# Patient Record
Sex: FEMALE | Race: WHITE
[De-identification: names, ages, dates, MRNs, and addresses within clinical notes are randomized per-mention and may not be internally consistent; named-entity substitution may affect disease eponyms.]

---

## 2020-08-30 ENCOUNTER — HOSPITAL ENCOUNTER (EMERGENCY)
Dept: HOSPITAL 62 - ER | Age: 35
Discharge: HOME | End: 2020-08-30
Payer: SELF-PAY

## 2020-08-30 VITALS — SYSTOLIC BLOOD PRESSURE: 121 MMHG | DIASTOLIC BLOOD PRESSURE: 73 MMHG
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
## 2020-08-30 DIAGNOSIS — L03.012: ICD-10-CM

## 2020-08-30 DIAGNOSIS — F17.210: ICD-10-CM

## 2020-08-30 DIAGNOSIS — S60.351A: Primary | ICD-10-CM

## 2020-08-30 DIAGNOSIS — Z88.0: ICD-10-CM
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
## 2020-08-30 PROCEDURE — 99283 EMERGENCY DEPT VISIT LOW MDM: CPT

## 2020-08-30 NOTE — ER DOCUMENT REPORT
ED Hand/Wrist Injury





- General


Chief Complaint: Hand Pain


Stated Complaint: FINGER PAIN


Time Seen by Provider: 20 14:59


Primary Care Provider: 


[facility] [Provider Group] - Follow up as needed


[facility] [Provider Group] - Follow up as needed


Mode of Arrival: Ambulatory


Information source: Patient


Notes: 





35-year-old female presented to ED for the complaint of a splinter in the right 

thumb.  She states it has been there for about 6 days and now it has an 

infection and swelling and very painful throbbing.  The area was cleaned well 

with Betadine the splint was removed, thumb was cleaned well with soap and water

bacitracin and Band-Aid applied and patient will be sent home with prescription 

for Keflex.











REVIEW OF SYSTEMS:


CONSTITUTIONAL :  Denies fever,  chills, or sweats.  Denies recent illness.


SKIN:   Foreign body with infection to the end of the right thumb


.


ALL OTHER SYSTEMS REVIEWED AND NEGATIVE.








VITAL SIGNS: Within normal limits.


GENERAL:  No acute distress, non-toxic appearance.  


HEAD:  Normal with no signs of head trauma.


EYES:  PERRLA, EOMI, conjunctiva normal, no discharge.  


EARS:  Hearing grossly intact.


NOSE: Normal.


THROAT:  Oropharynx is normal. 


NECK:  Normal range of motion, no tenderness, supple, no lymphadenopathy, No 

adenopathy, no JVD.   


CHEST:  Clear breath sounds bilaterally.  No wheezes, rales, or rhonchi.  


CARDIAC:  Regular rate and rhythm.  S1 and S2, without murmurs, gallops, or 

rubs.


VASCULAR:  No Edema.  Peripheral pulses normal and equal in all extremities.


ABDOMEN: Normal and soft with no tenderness, no masses or pulsatile masses.


GASTROINTESTINAL: Bowel sounds normal


GENITOURINARY: Normal, No tenderness


LYMPATHTIC:  No lymphadenopathy noted.


MUSCULOSKELETAL:  Good range of motion of all major joints. Extremities without 

clubbing, cyanosis or edema.  


NEUROLOGICAL:  Alert and oriented x 3.  No focal sensory or strength deficits.  

Speech normal.  Follows commands appropriately.


PSYCHIATRIC:  Normal Affect, judgement and mood.


SKIN: Foreign body in the right distal thumb with abscess.  Patient states had 

complete relief after splinter was removed an abscess drained.





- HPI


Injury to: Thumb


Onset: Other - 6 days ago


Where: Home


Timing: Still present


Quality of pain: Sharp, Throbbing


Severity: Moderate


Pain Level: 3


Context: Other - Foreign body in right thumb with abscess





- Related Data


Allergies/Adverse Reactions: 


                                        





amoxicillin Allergy (Verified 20 14:52)


   


Penicillins Allergy (Verified 20 14:52)


   











Past Medical History





- General


Information source: Patient





- Social History


Smoking Status: Current Every Day Smoker


Cigarette use (# per day): Yes


Smoking Education Provided: Yes


Frequency of alcohol use: None


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


Family History: Reviewed & Not Pertinent


Patient has suicidal ideation: No


Patient has homicidal ideation: No





- Past Medical History


Cardiac Medical History: Reports: None


Pulmonary Medical History: Reports: None


EENT Medical History: Reports: None


Neurological Medical History: Reports: None


Endocrine Medical History: Reports: None


Renal/ Medical History: Reports: None


Malignancy Medical History: Reports: None


GI Medical History: Reports: None


Musculoskeletal Medical History: Reports None


Skin Medical History: Reports None


Psychiatric Medical History: Reports: None


Traumatic Medical History: Reports: None


Infectious Medical History: Reports: None


Past Surgical History: Reports: Hx  Section, Hx Tonsillectomy





Physical Exam





- Vital signs


Vitals: 


                                        











Temp Pulse Resp BP Pulse Ox


 


 98.2 F   89   18   121/73   100 


 


 20 14:25  20 14:25  20 14:25  20 14:25  20 14:25














Course





- Vital Signs


Vital signs: 


                                        











Temp Pulse Resp BP Pulse Ox


 


 98.2 F   89   18   121/73   100 


 


 20 14:25  20 14:25  20 14:25  20 14:25  20 14:25














Procedures





- Incision and Drainage


  ** Right Thumb


Time completed: 15:10


Type: Simple


Anesthetic type: Other - 0


mL's of anesthetic: 0


Blade size: Other - 18 ga


I&D procedure: Betadine prep applied


Incision Method: Incision made by scalpel


Amount/type of drainage: Moderate amount of purulent drainage with a splinter





Discharge





- Discharge


Clinical Impression: 


 Foreign body of right thumb with infection





Condition: Stable


Disposition: HOME, SELF-CARE


Additional Instructions: 


ABSCESS:





     You have an abscess (boil).  This a pus-forming infection, usually due to 

staph.  Some boils may be left to drain on their own, but most require lancing.


     From the time the tender lump first appears, it may be three or four days 

before the abscess is ready to julian.  Local heat and rest help at this stage of

treatment.  An antibiotic may prevent spread of the infection.


     Once the abscess is opened, packing may be placed into it.  This is done so

pus is not sealed inside by premature closure of the cavity. The packing will be

removed at your follow-up visit or you may be advised to remove it yourself at 

home.  Sometimes this packing must be replaced a few times during healing.


     The wound will heal with surprisingly little scar.  Depending on the size 

and location of an abscess, healing can take one to four weeks.


     You may shower and wash the area around the incision site two or three 

times a day.


     Antibiotics may be prescribed, but are usually not necessary after an 

abscess has been drained.


     If you develop fever, chills, worsening pain, or increasing swelling in the

area, call the doctor or return immediately.








POST INCISION AND DRAINAGE:


     You have had an incision made to allow drainage of an abscess. The incision

must remain open so that pus and debris can drain from the wound.


     If the abscess cavity is large, packing is placed.  This keeps the tissues 

from collapsing and trapping pus inside, while the body shrinks the cavity.  The

packing may need to be replaced every day or two.  The physician will instruct 

you on the packing.


     Keep a bulky dressing over the area.  Replace it if it becomes saturated 

with blood or pus.  Do not disturb the packing (if present).


     You may shower and cleanse the area with gentle soap and warm water two or 

three times a day.  Local warmth may be soothing, and may promote faster 

healing.


     Return if you develop high fever or chills, or if you note spreading 

redness, increasing swelling, or increasing tenderness.





CEPHALEXIN:


     The antibiotic you've been prescribed is a member of the cephalosporin 

class.  This type of antibiotic covers a wide variety of infections, including 

those of the skin, lungs, and urinary tract. It's useful for staph infections.


     This antibiotic is slightly similar to the penicillin family. In rare 

cases, a person who is allergic to penicillin will also be allergic to this 

medication.  If you have had a severe allergic reaction to penicillin, and have 

not taken this antibiotic since that time, notify your doctor.


     Antibiotics which cover many germs ("broad spectrum" antibiotics) are more 

likely to cause diarrhea or "yeast" infections.  Women prone to vaginal yeast 

problems may suffer an attack after taking this antibiotic.  In infants, oral 

thrush (white spots "stuck" on the cheek) or yeast diaper rash may result.  See 

your doctor if these problems occur.  Call at once if you develop itching, 

hives, shortness of breath, or lightheadedness.





Epsom Salt Soaks





     Soak the wound area in a container of warm epsom salt water.  If you can't 

get the wound area into a bucket or pan, use a folded towel soaked in the epsom 

salt solution and apply to the area.


     Use clean hot tap water (about the temperature of a very warm bath), mixing

in about one (1) teaspoon for every pint of water.


           Two gallon --> 16 teaspoons Epsom Salts


           One gallon -->  8 teaspoons Epsom Salts


           Two quarts -->  4 teaspoons Epsom Salts


           One quart  -->  2 teaspoons Epsom Salts


     Soak the wound for about 20 minutes while gently moving it around in the 

water.  Repeat this four (4) times a day.





Antibiotic Ointment Protection





     Your wounds are such that dressing them is not practical or optional.  

After cleansing, you should apply a thin coating of antibiotic ointment 

(Bacitracin, not Neosporin) to the wounds at least three times daily.  This 

lessens infection risk, and may decrease the amount of scarring.  Use a q-tip or

dull butter knife, not your finger, to apply this ointment.


     Any debris or ooze which builds up in the ointment should be gently rubbed 

off with a sterile gauze pad.  Harder crusting may need to be gently scrubbed 

off with a clean wash cloth with soap and warm water, perhaps applying a warm, 

wet wash cloth to the wound for ten minutes first.


     Development of redness, severe itching, or blistering may mean allergy to 

the ointment.  See the doctor.








Ibuprofen





     Ibuprofen is an excellent, safe drug for pain control.  In addition, it has

potent antiinflammatory effects which are beneficial, especially in the 

treatment of injuries, arthritis, or tendonitis. It's best to take ibuprofen 

with food.  Persons with ulcer disease or allergy to aspirin should notify their

physician of this before taking ibuprofen.


     Take the medication exactly as prescribed.  Don't take additional doses 

unless instructed to do so by your doctor.  If you develop wheezing, shortness 

of breath, hives, faintness, stomach pain, vomiting, or dark black stools, 

return for re-evaluation at once.





FOLLOW-UP CARE:


Most simple abscesses will not require a follow up visit.   If you had packing 

placed in the abscess, remove it as instructed by the physician.  If you have be

en referred to a physician for follow-up care, call the physicians office for 

an appointment as you were instructed or within the next two days.  If you 

experience worsening or a significant change in your symptoms, return to the 

Emergency Department at any time for re-evaluation.





Prescriptions: 


Cephalexin Monohydrate [Keflex 500 mg Capsule] 500 mg PO Q6H 5 Days #20 capsule


Referrals: 


[facility] [Provider Group] - Follow up as needed


[facility] [Provider Group] - Follow up as needed

## 2020-08-31 ENCOUNTER — HOSPITAL ENCOUNTER (EMERGENCY)
Dept: HOSPITAL 62 - ER | Age: 35
LOS: 1 days | Discharge: HOME | End: 2020-09-01
Payer: SELF-PAY

## 2020-08-31 DIAGNOSIS — L02.511: Primary | ICD-10-CM

## 2020-08-31 DIAGNOSIS — L03.011: ICD-10-CM

## 2020-08-31 DIAGNOSIS — Z98.890: ICD-10-CM

## 2020-08-31 DIAGNOSIS — F17.210: ICD-10-CM

## 2020-08-31 DIAGNOSIS — Z88.0: ICD-10-CM

## 2020-08-31 PROCEDURE — 80048 BASIC METABOLIC PNL TOTAL CA: CPT

## 2020-08-31 PROCEDURE — 26010 DRAINAGE OF FINGER ABSCESS: CPT

## 2020-08-31 PROCEDURE — 87077 CULTURE AEROBIC IDENTIFY: CPT

## 2020-08-31 PROCEDURE — 96375 TX/PRO/DX INJ NEW DRUG ADDON: CPT

## 2020-08-31 PROCEDURE — 87040 BLOOD CULTURE FOR BACTERIA: CPT

## 2020-08-31 PROCEDURE — 73140 X-RAY EXAM OF FINGER(S): CPT

## 2020-08-31 PROCEDURE — 87070 CULTURE OTHR SPECIMN AEROBIC: CPT

## 2020-08-31 PROCEDURE — 85025 COMPLETE CBC W/AUTO DIFF WBC: CPT

## 2020-08-31 PROCEDURE — 96365 THER/PROPH/DIAG IV INF INIT: CPT

## 2020-08-31 PROCEDURE — 87205 SMEAR GRAM STAIN: CPT

## 2020-08-31 PROCEDURE — 99284 EMERGENCY DEPT VISIT MOD MDM: CPT

## 2020-08-31 PROCEDURE — 87186 SC STD MICRODIL/AGAR DIL: CPT

## 2020-08-31 PROCEDURE — 36415 COLL VENOUS BLD VENIPUNCTURE: CPT

## 2020-09-01 VITALS — SYSTOLIC BLOOD PRESSURE: 102 MMHG | DIASTOLIC BLOOD PRESSURE: 64 MMHG

## 2020-09-01 LAB
ADD MANUAL DIFF: NO
ANION GAP SERPL CALC-SCNC: 7 MMOL/L (ref 5–19)
BASOPHILS # BLD AUTO: 0.1 10^3/UL (ref 0–0.2)
BASOPHILS NFR BLD AUTO: 1.5 % (ref 0–2)
BUN SERPL-MCNC: 10 MG/DL (ref 7–20)
CALCIUM: 8.8 MG/DL (ref 8.4–10.2)
CHLORIDE SERPL-SCNC: 110 MMOL/L (ref 98–107)
CO2 SERPL-SCNC: 25 MMOL/L (ref 22–30)
EOSINOPHIL # BLD AUTO: 0.3 10^3/UL (ref 0–0.6)
EOSINOPHIL NFR BLD AUTO: 6 % (ref 0–6)
ERYTHROCYTE [DISTWIDTH] IN BLOOD BY AUTOMATED COUNT: 14.2 % (ref 11.5–14)
GLUCOSE SERPL-MCNC: 105 MG/DL (ref 75–110)
HCT VFR BLD CALC: 36.4 % (ref 36–47)
HGB BLD-MCNC: 12.1 G/DL (ref 12–15.5)
LYMPHOCYTES # BLD AUTO: 2.1 10^3/UL (ref 0.5–4.7)
LYMPHOCYTES NFR BLD AUTO: 36.2 % (ref 13–45)
MCH RBC QN AUTO: 28.3 PG (ref 27–33.4)
MCHC RBC AUTO-ENTMCNC: 33.2 G/DL (ref 32–36)
MCV RBC AUTO: 85 FL (ref 80–97)
MONOCYTES # BLD AUTO: 0.5 10^3/UL (ref 0.1–1.4)
MONOCYTES NFR BLD AUTO: 8.4 % (ref 3–13)
NEUTROPHILS # BLD AUTO: 2.8 10^3/UL (ref 1.7–8.2)
NEUTS SEG NFR BLD AUTO: 47.9 % (ref 42–78)
PLATELET # BLD: 229 10^3/UL (ref 150–450)
POTASSIUM SERPL-SCNC: 4 MMOL/L (ref 3.6–5)
RBC # BLD AUTO: 4.26 10^6/UL (ref 3.72–5.28)
TOTAL CELLS COUNTED % (AUTO): 100 %
WBC # BLD AUTO: 5.8 10^3/UL (ref 4–10.5)

## 2020-09-01 NOTE — ER DOCUMENT REPORT
ED Medical Screen (RME)





- General


Chief Complaint: Hand Pain


Stated Complaint: THUMB PAIN


Time Seen by Provider: 20 00:15


Mode of Arrival: Ambulatory


Information source: Patient


Notes: 





35-year-old female presented to ED for increasing pain and swelling to the right

thumb.  She was seen yesterday for a splinter in her right thumb.  She did have 

an abscess at that time.  It was cleaned with Betadine it was opened up with a 

18-gauge needle and the thorn was removed.  She states she had relief instantly 

soon as the thorn and the drainage was completed.  She states she went home got 

the antibiotics as instructed took her antibiotics and soak the finger in Epson 

salt as instructed and the pain came back worse.  She states the pain is much 

worse today than it was yesterday.  She is taking her medicine as she is 

instructed.  She is a ex-heroin addict and refused narcotics last night.  She 

states she was just in [place] for heroin abuse and was on Suboxone but is off of 

it now and is afraid of narcotics.

















I have greeted and performed a rapid initial assessment of this patient.  A 

comprehensive ED assessment and evaluation of the patient, analysis of test 

results and completion of medical decision making process will be conducted by 

an additional ED providers.





- Related Data


Allergies/Adverse Reactions: 


                                        





amoxicillin Allergy (Verified 20 14:52)


   


Penicillins Allergy (Verified 20 14:52)


   











Past Medical History


Past Surgical History: Reports: Hx  Section, Hx Tonsillectomy





Physical Exam





- Vital signs


Vitals: 





                                        











Temp Pulse Resp BP Pulse Ox


 


 98.3 F   81   18   90/71 L  98 


 


 20 22:25  20 22:25  20 22:25  20 22:25  20 22:25














Course





- Vital Signs


Vital signs: 





                                        











Temp Pulse Resp BP Pulse Ox


 


 98.3 F   81   18   90/71 L  98 


 


 20 22:25  20 22:25  20 22:25  20 22:25  20 22:25

## 2020-09-01 NOTE — RADIOLOGY REPORT (SQ)
EXAM DESCRIPTION: 



XR FINGERS



COMPLETED DATE/TME:  09/01/2020 00:15



CLINICAL HISTORY: 



35 years, Female, pain and swelling to right thumb



COMPARISON:

None.



NUMBER OF VIEWS:

3



TECHNIQUE:

3 views of the right thumb



LIMITATIONS:

None.



FINDINGS:



Negative for acute fracture or dislocation. Mild soft tissue

swelling distally. No radiopaque foreign body. No soft tissue gas



IMPRESSION:



Mild soft tissue swelling, otherwise unremarkable right thumb

 



copyright 2011 [employer]- All Rights Reserved

## 2020-09-01 NOTE — ER DOCUMENT REPORT
ED Hand/Wrist Injury





- General


Chief Complaint: Skin Problem


Stated Complaint: THUMB PAIN


Time Seen by Provider: 20 00:15


Mode of Arrival: Ambulatory


Information source: Patient, Relative


Notes: 





Patient is a 35-year-old female comes back to the emergency room complaint right

thumb pain.  Patient was seen here yesterday and had a plant thorn removed from 

her distal tip of her right thumb.  She was placed on Keflex and sent home.  

Overnight the thumb started to swell up and patient is been having pain.  She 

states she has been taking the antibiotics.  She is here for reevaluation.


TRAVEL OUTSIDE OF THE [place] IN LAST 30 DAYS: No





- HPI


Injury to: Thumb


Onset: Yesterday


Where: Outdoors


Timing: Constant, Worse


Quality of pain: Pressure, Sharp, Throbbing


Severity: Moderate


Pain Level: 3


Context: Other - Bush thorn in finger





- Related Data


Allergies/Adverse Reactions: 


                                        





amoxicillin Allergy (Verified 20 14:52)


   


Penicillins Allergy (Verified 20 14:52)


   











Past Medical History





- General


Information source: Patient





- Social History


Smoking Status: Current Every Day Smoker


Cigarette use (# per day): Yes


Chew tobacco use (# tins/day): No


Smoking Education Provided: No


Frequency of alcohol use: None


Drug Abuse: None


Lives with: Family


Family History: Reviewed & Not Pertinent


Past Surgical History: Reports: Hx  Section, Hx Tonsillectomy





Review of Systems





- Review of Systems


Constitutional: No symptoms reported


EENT: No symptoms reported


Cardiovascular: No symptoms reported


Respiratory: No symptoms reported


Gastrointestinal: No symptoms reported


Genitourinary: No symptoms reported


Female Genitourinary: No symptoms reported


Musculoskeletal: No symptoms reported


Skin: Other - Cellulitis right thumb


Hematologic/Lymphatic: No symptoms reported


Neurological/Psychological: No symptoms reported





Physical Exam





- Vital signs


Vitals: 





                                        











Temp Pulse Resp BP Pulse Ox


 


 98.3 F   81   18   90/71 L  98 


 


 20 22:25  20 22:25  20 22:25  20 22:25  20 22:25











Interpretation: Hypotensive





- Notes


Notes: 





PHYSICAL EXAMINATION:





GENERAL: Patient is a well-nourished well-developed 35-year-old female no ap

parent distress on physical exam does appear somewhat uncomfortable in obvious 

pain.





HEAD: Atraumatic, normocephalic.





EYES: Pupils equal round and reactive to light, extraocular movements intact, co

njunctiva are normal.





LUNGS: Breath sounds clear to auscultation bilaterally and equal.  No wheezes 

rales or rhonchi.





HEART: Regular rate and rhythm without murmurs





Musculoskeletal: Examination patient's right thumb shows there to be moderate 

amount of swelling at the distal tip in the pad.  Does not appear to be open to 

the nail.  There is some discoloration in that area and it is fluctuant to 

palpation.  Patient has good distal flexion and extension of the distal tip of 

the thumb she has good sensation all around with a moderate amount of pain to 

light palpation of that pad on the finger that is fluctuant.  





NEUROLOGICAL:  Normal speech, normal gait.  Normal sensory, motor exams





PSYCH: Normal mood, normal affect.





SKIN: Warm, Dry, normal turgor, no rashes or lesions noted.





Course





- Vital Signs


Vital signs: 





                                        











Temp Pulse Resp BP Pulse Ox


 


 98.9 F   76   18   120/68   96 


 


 20 05:34  20 05:34  20 05:34  20 05:34  20 05:34














- Laboratory


Result Diagrams: 


                                 20 06:00





                                 20 06:00


Laboratory results interpreted by me: 





                                        











  20





  06:00 06:00


 


RDW  14.2 H 


 


Chloride   110 H














Procedures





- Incision and Drainage


  ** Right Thumb


Time completed: 07:25


Type: Simple


Anesthetic type: 1% Lidocaine


mL's of anesthetic: 1


Blade size: 11


I&D procedure: Betadine prep applied


Incision Method: Incision made by scalpel


Amount/type of drainage: 3 mL's purulent chunky white pus





Discharge





- Discharge


Clinical Impression: 


 Abscess of thumb, right





Condition: Stable


Disposition: HOME, SELF-CARE


Instructions:  Abscess ([facility]), Cephalexin ([facility])


Additional Instructions: 


Home and rest.  You are going to need to soak this in warm moist compresses 3-4 

times a day.  Make it as warm as you can stand up from the sink do not put it in

the microwave or boiling water.  You can either do that or you may soak it in 

warm soapy water.  Take all the antibiotics.  Placing you on doxycycline and 

continue with the Keflex.  I am also going to write you for Diflucan pill and a 

little pain medication.  Monitor this very closely if it continues to get worse 

or you spike a fever return to ER for reevaluation.  Your x-ray did not show any

type of foreign matter at this time.


Prescriptions: 


Fluconazole [Diflucan] 150 mg PO ONCE #1 tablet


Doxycycline Hyclate 100 mg PO BID #20 tablet.


Hydrocodone/Acetaminophen [Norco 5-325 mg Tablet] 1 tab PO Q6 PRN #15 tablet


 PRN Reason: 


Referrals: 


[facility] [Provider Group] - Follow up as needed

## 2020-09-18 ENCOUNTER — HOSPITAL ENCOUNTER (EMERGENCY)
Dept: HOSPITAL 62 - ER | Age: 35
Discharge: HOME | End: 2020-09-18
Payer: SELF-PAY

## 2020-09-18 VITALS — DIASTOLIC BLOOD PRESSURE: 72 MMHG | SYSTOLIC BLOOD PRESSURE: 112 MMHG

## 2020-09-18 DIAGNOSIS — L02.511: ICD-10-CM

## 2020-09-18 DIAGNOSIS — Z88.0: ICD-10-CM

## 2020-09-18 DIAGNOSIS — Z87.892: ICD-10-CM

## 2020-09-18 DIAGNOSIS — Z91.14: ICD-10-CM

## 2020-09-18 DIAGNOSIS — L03.211: Primary | ICD-10-CM

## 2020-09-18 DIAGNOSIS — T36.4X6A: ICD-10-CM

## 2020-09-18 DIAGNOSIS — Z88.2: ICD-10-CM

## 2020-09-18 DIAGNOSIS — F17.200: ICD-10-CM

## 2020-09-18 DIAGNOSIS — Z91.128: ICD-10-CM

## 2020-09-18 DIAGNOSIS — L98.9: ICD-10-CM

## 2020-09-18 DIAGNOSIS — B95.62: ICD-10-CM

## 2020-09-18 LAB
ADD MANUAL DIFF: NO
ALBUMIN SERPL-MCNC: 3.5 G/DL (ref 3.5–5)
ALP SERPL-CCNC: 68 U/L (ref 38–126)
ANION GAP SERPL CALC-SCNC: 6 MMOL/L (ref 5–19)
AST SERPL-CCNC: 18 U/L (ref 14–36)
BASOPHILS # BLD AUTO: 0.1 10^3/UL (ref 0–0.2)
BASOPHILS NFR BLD AUTO: 1 % (ref 0–2)
BILIRUB DIRECT SERPL-MCNC: 0.3 MG/DL (ref 0–0.4)
BILIRUB SERPL-MCNC: 0.4 MG/DL (ref 0.2–1.3)
BUN SERPL-MCNC: 8 MG/DL (ref 7–20)
CALCIUM: 8.8 MG/DL (ref 8.4–10.2)
CHLORIDE SERPL-SCNC: 104 MMOL/L (ref 98–107)
CO2 SERPL-SCNC: 27 MMOL/L (ref 22–30)
EOSINOPHIL # BLD AUTO: 0.2 10^3/UL (ref 0–0.6)
EOSINOPHIL NFR BLD AUTO: 2.6 % (ref 0–6)
ERYTHROCYTE [DISTWIDTH] IN BLOOD BY AUTOMATED COUNT: 14.1 % (ref 11.5–14)
GLUCOSE SERPL-MCNC: 99 MG/DL (ref 75–110)
HCT VFR BLD CALC: 34.2 % (ref 36–47)
HGB BLD-MCNC: 11.5 G/DL (ref 12–15.5)
LYMPHOCYTES # BLD AUTO: 1.9 10^3/UL (ref 0.5–4.7)
LYMPHOCYTES NFR BLD AUTO: 29.8 % (ref 13–45)
MCH RBC QN AUTO: 27.7 PG (ref 27–33.4)
MCHC RBC AUTO-ENTMCNC: 33.6 G/DL (ref 32–36)
MCV RBC AUTO: 83 FL (ref 80–97)
MONOCYTES # BLD AUTO: 0.3 10^3/UL (ref 0.1–1.4)
MONOCYTES NFR BLD AUTO: 4.8 % (ref 3–13)
NEUTROPHILS # BLD AUTO: 3.9 10^3/UL (ref 1.7–8.2)
NEUTS SEG NFR BLD AUTO: 61.8 % (ref 42–78)
PLATELET # BLD: 236 10^3/UL (ref 150–450)
POTASSIUM SERPL-SCNC: 4 MMOL/L (ref 3.6–5)
PROT SERPL-MCNC: 6.4 G/DL (ref 6.3–8.2)
RBC # BLD AUTO: 4.14 10^6/UL (ref 3.72–5.28)
TOTAL CELLS COUNTED % (AUTO): 100 %
WBC # BLD AUTO: 6.3 10^3/UL (ref 4–10.5)

## 2020-09-18 PROCEDURE — 36415 COLL VENOUS BLD VENIPUNCTURE: CPT

## 2020-09-18 PROCEDURE — 99284 EMERGENCY DEPT VISIT MOD MDM: CPT

## 2020-09-18 PROCEDURE — 85025 COMPLETE CBC W/AUTO DIFF WBC: CPT

## 2020-09-18 PROCEDURE — 83605 ASSAY OF LACTIC ACID: CPT

## 2020-09-18 PROCEDURE — 84703 CHORIONIC GONADOTROPIN ASSAY: CPT

## 2020-09-18 PROCEDURE — 87040 BLOOD CULTURE FOR BACTERIA: CPT

## 2020-09-18 PROCEDURE — 80053 COMPREHEN METABOLIC PANEL: CPT

## 2020-09-18 PROCEDURE — 70487 CT MAXILLOFACIAL W/DYE: CPT

## 2020-09-18 NOTE — ER DOCUMENT REPORT
ED Medical Screen (RME)





- General


Chief Complaint: Abscess


Stated Complaint: SKIN PROBLEM/POSSIBLE ABCESS


Time Seen by Provider: 20 13:59


Mode of Arrival: Ambulatory


Information source: Patient


Notes: 





HPI; 35-year-old female presents to the emergency room complaining of multiple 

sores, 1 to her right forehead, 1 to her right lower cheek, 1 to the right side 

of her neck.  States she noticed them a couple of days ago except one on the 

forehead she noticed this morning.  Recently put on Keflex for a abscess to her 

right thumb, positive MRSA.  Patient states she is allergic to penicillin and 

amoxicillin which causes her to have hives and anaphylaxis.  She is limping on 

Keflex for 3 days.  She denies any fevers.  No shortness of breath, no 

difficulty breathing.  States is been hospitalized in the past secondary to 

abscesses.





PE:


Alert and oriented x3.  Mild distress noted.  Right forehead with a 2 cm 

slightly raised erythematous nonfluctuant abscess.  Right lower jaw with a 1 cm 

open lesion that is actively draining purulent drainage.  There is another half 

centimeter lesion to the right lateral neck that is erythematous with purulent 

drainage noted.  They are warm and tender to palpation.  Mild swelling noted to 

the right eye.  Lungs: Clear to auscultation without rales, rhonchi, wheezes.  

Heart: Regular rate rhythm without murmurs rubs or gallops.  Will get labs, harjit

t for possible allergic reaction secondary to being on Keflex, IV antibiotics 

and reevaluate by another provider.





I have greeted and performed a rapid initial assessment of this patient.  A 

comprehensive ED assessment and evaluation of the patient, analysis of test 

results and completion of the medical decision making process will be conducted 

by additional ED providers.  I have specifically instructed the patient or 

family members with the patient to immediately return to any nursing staff 

should anything change in the patient's condition or with their chief complaint.


TRAVEL OUTSIDE OF THE [place] IN LAST 30 DAYS: No





- Related Data


Allergies/Adverse Reactions: 


                                        





amoxicillin Allergy (Verified 20 14:52)


   


Penicillins Allergy (Verified 20 14:52)


   


Sulfa (Sulfonamide Antibiotics) Allergy (Verified 20 13:58)


   











Past Medical History





- Social History


Frequency of alcohol use: None


Drug Abuse: None


Past Surgical History: Reports: Hx  Section, Hx Tonsillectomy





Physical Exam





- Vital signs


Vitals: 





                                        











Temp Pulse Resp BP Pulse Ox


 


 97.8 F   58 L  15   108/83   98 


 


 20 13:57  20 13:57  20 13:57  20 13:57  20 13:57














Course





- Vital Signs


Vital signs: 





                                        











Temp Pulse Resp BP Pulse Ox


 


 97.8 F   58 L  15   108/83   98 


 


 20 13:57  20 13:57  20 13:57  20 13:57  20 13:57

## 2020-09-18 NOTE — ER DOCUMENT REPORT
ED General





- General


Mode of Arrival: Ambulatory


Information source: Patient


TRAVEL OUTSIDE OF THE [place] IN LAST 30 DAYS: No





<EVA LAWTON - Last Filed: 20 19:52>





<AYESHA DUMONT - Last Filed: 20 22:57>





- General


Chief Complaint: Abscess


Stated Complaint: SKIN PROBLEM/POSSIBLE ABCESS


Time Seen by Provider: 20 13:59


Notes: 





RME HPI:


 35-year-old female presents to the emergency room complaining of multiple sore

s, 1 to her right forehead, 1 to her right lower cheek, 1 to the right side of 

her neck.  States she noticed them a couple of days ago except one on the 

forehead she noticed this morning.  Recently put on Keflex for a abscess to her 

right thumb, positive MRSA.  Patient states she is allergic to penicillin and 

amoxicillin which causes her to have hives and anaphylaxis.  She has been on 

Keflex for 3 days.  This was prescribed here several weeks ago, she just got it 

filled.  She was also given doxycycline a few weeks ago, she did not get that 

filled.  She denies any fevers.  No shortness of breath, no difficulty 

breathing.  States is been hospitalized in the past secondary to abscesses. 

(EVA LAWTON)





- Related Data


Allergies/Adverse Reactions: 


                                        





amoxicillin Allergy (Verified 20 14:52)


   


Penicillins Allergy (Verified 20 14:52)


   


Sulfa (Sulfonamide Antibiotics) Allergy (Verified 20 13:58)


   











Past Medical History





- General


Information source: Patient





- Social History


Smoking Status: Current Every Day Smoker


Frequency of alcohol use: None


Drug Abuse: None


Family History: Reviewed & Not Pertinent


Infectious Medical History: Reports: Hx MRSA


Past Surgical History: Reports: Hx  Section, Hx Tonsillectomy





<EVA LAWTON - Last Filed: 20 19:52>





Review of Systems





- Review of Systems


Constitutional: No symptoms reported


EENT: No symptoms reported


Cardiovascular: No symptoms reported


Respiratory: No symptoms reported


Gastrointestinal: No symptoms reported


Genitourinary: No symptoms reported


Female Genitourinary: No symptoms reported


Musculoskeletal: No symptoms reported


Skin: See HPI


Hematologic/Lymphatic: No symptoms reported


Neurological/Psychological: No symptoms reported





<EVA LAWTON - Last Filed: 20 19:52>





Physical Exam





<EVA LAWTON - Last Filed: 20 19:52>





- Vital signs


Vitals: 





                                        











Temp Pulse Resp BP Pulse Ox


 


 97.8 F   58 L  15   108/83   98 


 


 20 13:57  20 13:57  20 13:57  20 13:57  20 13:57














- Notes


Notes: 





PHYSICAL EXAMINATION:





GENERAL: Well-appearing, well-nourished and in no acute distress.





HEAD: Atraumatic, normocephalic.





EYES: Pupils equal round extraocular movements intact,  conjunctiva are normal.





ENT: Nares patent





NECK: Normal range of motion





LUNGS: No respiratory distress





Musculoskeletal: Normal range of motion





NEUROLOGICAL:  Normal speech, normal gait. 





PSYCH: Normal mood, normal affect.





SKIN: Multiple lesions noted to patient's face, specifically to the right 

forehead, cheek and neck.  The one on the cheek and neck are open and appear to 

have been draining, the one on the forehead is not.  There is induration and 

fluctuance noted on palpation.  There is also surrounding erythema.  Patient 

also has multiple scattered lesions/sores to her upper extremities.


 (EVA LAWTON)





Course





- Laboratory


Result Diagrams: 


                                 20 14:14





                                 20 14:14





<EVA LAWTON - Last Filed: 20 19:52>





- Laboratory


Result Diagrams: 


                                 20 14:14





                                 20 14:14





<AYESHA DUMONT - Last Filed: 20 22:57>





- Re-evaluation


Re-evalutation: 





At the time of my initial evaluation patient has been seen by her triage 

provider.  Her work-up had been initiated.  All of her laboratory investigations

were unremarkable.  Patient reports noncompliance with recent antibiotics due to

inability to pick them up until 3 days ago.  Patient has a history of abscesses 

in similar areas.  The last time she had this she states that a CAT scan showed 

extensive abscesses that she had to be admitted for.  For this reason we will 

add on a CT scan of the soft tissues of the face.





After reviewing patient's record it was found that patient has a different name 

in the computer.  She has an alias of Susanne Cardenas, same date of birth, 

please review this for additional past medical history to include history of 

MRSA, IV drug use and multiple admissions.


 (EVA LAWTON)





20 22:50


CT with no drainable abscess noted, no bony abnormality, no acute findings 

except soft tissue swelling.  On exam this does appear to be cellulitis.  There 

was an area over the right lateral neck that appears to be recently drained but 

this does not appear to be indurated or fluctuant, there is an area that is 

borderline over the forehead but this is not overtly indurated or fluctuant 

either.  Patient is afebrile, has no leukocytosis, she is polite, talkative, 

well-appearing.  Discussed options.  Initial plan at handoff was discharged to 

home on clindamycin if CT was unremarkable, patient is very agreeable with this 

plan, discussed follow-up and return precautions, patient states understanding 

and agreement. (AYESHA DUMONT)





- Vital Signs


Vital signs: 





                                        











Temp Pulse Resp BP Pulse Ox


 


 98.2 F   64   16   112/72   100 


 


 20 17:48  20 17:48  20 17:48  20 17:48  20 17:48














- Laboratory


Laboratory results interpreted by me: 





                                        











  20





  14:14


 


Hgb  11.5 L


 


Hct  34.2 L


 


RDW  14.1 H














Discharge





<EVA LAWTON - Last Filed: 20 19:52>





<AYSEHA DUMONT - Last Filed: 20 22:57>





- Discharge


Clinical Impression: 


 Skin infection, Facial cellulitis





Condition: Stable


Disposition: HOME, SELF-CARE


Additional Instructions: 


Your imaging does not show an abscess or concerning finding but does indicate 

the skin infection as we discussed.  I recommend that you keep the areas clean, 

clean with soap and water, apply warm compresses to the areas as well (these may

develop into abscesses/boils which could then drain).  Take the antibiotics as 

prescribed to completion.  Follow-up with primary care.





Return if you are worse including spreading redness, increased swelling, fever, 

vomiting, or any other concerning or worsening symptoms.


Prescriptions: 


Clindamycin HCl [Cleocin 150 mg Capsule] 150 mg PO Q6 #56 capsule